# Patient Record
Sex: MALE | Race: AMERICAN INDIAN OR ALASKA NATIVE
[De-identification: names, ages, dates, MRNs, and addresses within clinical notes are randomized per-mention and may not be internally consistent; named-entity substitution may affect disease eponyms.]

---

## 2022-09-22 ENCOUNTER — HOSPITAL ENCOUNTER (EMERGENCY)
Dept: HOSPITAL 5 - ED | Age: 42
Discharge: HOME | End: 2022-09-22
Payer: COMMERCIAL

## 2022-09-22 VITALS — SYSTOLIC BLOOD PRESSURE: 128 MMHG | DIASTOLIC BLOOD PRESSURE: 72 MMHG
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
## 2022-09-22 DIAGNOSIS — S61.411A: Primary | ICD-10-CM
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
## 2022-09-22 PROCEDURE — 90714 TD VACC NO PRESV 7 YRS+ IM: CPT

## 2022-09-22 PROCEDURE — 96372 THER/PROPH/DIAG INJ SC/IM: CPT

## 2022-09-22 PROCEDURE — 90471 IMMUNIZATION ADMIN: CPT

## 2022-09-22 PROCEDURE — 99282 EMERGENCY DEPT VISIT SF MDM: CPT

## 2022-09-22 NOTE — EMERGENCY DEPARTMENT REPORT
- General


Chief complaint: Wound/Laceration


Stated complaint: RT ARM LACERATION


Time Seen by Provider: 09/22/22 16:41


Source: patient


Mode of arrival: Ambulatory


Limitations: No Limitations





- History of Present Illness


Initial comments: 


42-year-old male presents with laceration to his right hand.  Patient reports he

was at work this morning when he accidentally cut himself with an equipment, 

states the bleeding was so severe and profuse that he went to the urgent care 

and was told "it might be an arterial bleed and come to the emergency 

department".  Upon examination the bleeding has resolved.  He denies weakness, 

he denies use of blood thinners, no paresthesia paralysis of the extremity, no 

headache dizziness vision changes, no chest pain shortness of breath








- Related Data


                                  Previous Rx's











 Medication  Instructions  Recorded  Last Taken  Type


 


Ibuprofen [Motrin 800 MG tab] 800 mg PO TID PRN #20 tablet 09/22/22 Unknown Rx











                                    Allergies











Allergy/AdvReac Type Severity Reaction Status Date / Time


 


No Known Allergies Allergy   Unverified 09/22/22 15:42














Abscess Boil HPI





- HPI


Chief Complaint: Wound/Laceration


Stated Complaint: RT ARM LACERATION


Time Seen by Provider: 09/22/22 16:41


Home Medications: 


                                  Previous Rx's











 Medication  Instructions  Recorded  Last Taken  Type


 


Ibuprofen [Motrin 800 MG tab] 800 mg PO TID PRN #20 tablet 09/22/22 Unknown Rx











Allergies/Adverse Reactions: 


                                    Allergies











Allergy/AdvReac Type Severity Reaction Status Date / Time


 


No Known Allergies Allergy   Unverified 09/22/22 15:42














ED Review of Systems


ROS: 


Stated complaint: RT ARM LACERATION


Other details as noted in HPI





Constitutional: see HPI


Respiratory: denies: cough, shortness of breath


Cardiovascular: denies: chest pain, palpitations


Endocrine: denies: excessive sweating, intolerance to cold, intolerance to heat


Gastrointestinal: denies: abdominal pain, nausea


Genitourinary: denies: frequency


Musculoskeletal: denies: back pain, joint swelling, arthralgia, myalgia


Skin: as per HPI


Neurological: denies: headache, weakness, numbness


Hematological/Lymphatic: denies: easy bleeding, easy bruising





ED Past Medical Hx





- Past Medical History


Previous Medical History?: No





- Surgical History


Past Surgical History?: No





- Social History


Smoking Status: Never Smoker


Substance Use Type: None





- Medications


Home Medications: 


                                Home Medications











 Medication  Instructions  Recorded  Confirmed  Last Taken  Type


 


Ibuprofen [Motrin 800 MG tab] 800 mg PO TID PRN #20 tablet 09/22/22  Unknown Rx














ED Physical Exam





- General


Limitations: No Limitations


General appearance: alert, in no apparent distress





- Head


Head exam: Present: atraumatic





- Eye


Eye exam: Present: normal appearance





- ENT


ENT exam: Present: normal exam, normal orophraynx





- Neck


Neck exam: Present: normal inspection.  Absent: tenderness





- Respiratory


Respiratory exam: Present: normal lung sounds bilaterally





- Cardiovascular


Cardiovascular Exam: Present: regular rate, normal rhythm





- GI/Abdominal


GI/Abdominal exam: Present: soft





- Extremities Exam


Extremities exam: Present: normal inspection, full ROM





- Back Exam


Back exam: Present: normal inspection, full ROM





- Neurological Exam


Neurological exam: Present: alert, oriented X3





- Psychiatric


Psychiatric exam: Present: normal affect, normal mood





- Skin


Skin exam: Present: warm, dry, intact





- Expanded Skin Exam


  ** Expanded


Type of lesion: Present: laceration





                            __________________________














                            __________________________





 1 - 1 cm linear laceration and appears to have resolved bleeding.  No visible 

bone or tissue.








- Other


Other exam information: 





Intact pulses sensation cap refill, full range of motion.  No bony tenderness.  

No obvious swelling or deformity of the extremity





ED Course


                                   Vital Signs











  09/22/22 09/22/22 09/22/22





  15:44 16:58 19:06


 


Temperature 98.2 F 98.4 F 98.4 F


 


Pulse Rate 92 H 76 76


 


Respiratory 18 18 20





Rate   


 


Blood Pressure 156/83 141/78 


 


Blood Pressure  141/78 128/72





[Left]   


 


O2 Sat by Pulse 98 99 97





Oximetry   














ED Medical Decision Making





- Medical Decision Making





Wound care performed, with area irrigated with saline, Betadine, dressed with 

antibiotic ointment Telfa, patient tolerated procedure well, no indication for 

laceration repair at this time.


Patient is neurovascular neuromuscularly intact, no sign of an arterial bleed at

 this time.  Patient has not had any active bleeding throughout his ED course.  

Discharge home w wound care instructions, pain management, follow-up with return

 precautions








Patient remained stable nontoxic-appearing, afebrile, ambulating steadily 

without assistance.  Gone over ED findings with patient as well as plan for 

follow-up.  Also discussed return precautions with patient, all questions and 

concerns addressed.  Patient is stable to be discharged follow-up outpatient.


Audio voice dictation device used, hence the chart might contain some dictation 

errors, mispronunciations, wrong spelling and wrong verbiage.





Critical care attestation.: 


If time is entered above; I have spent that time in minutes in the direct care 

of this critically ill patient, excluding procedure time.








ED Disposition


Clinical Impression: 


 Laceration





Disposition: 01 HOME / SELF CARE / HOMELESS


Is pt being admited?: No


Does the pt Need Aspirin: No


Condition: Stable


Instructions:  Laceration Care, Adult


Prescriptions: 


Ibuprofen [Motrin 800 MG tab] 800 mg PO TID PRN #20 tablet


 PRN Reason: Pain , Severe (7-10)


Referrals: 


PRIMARY CARE,MD [Primary Care Provider] - 3-5 Days


Forms:  Work/School Release Form(ED)